# Patient Record
Sex: MALE | Race: ASIAN | NOT HISPANIC OR LATINO | URBAN - METROPOLITAN AREA
[De-identification: names, ages, dates, MRNs, and addresses within clinical notes are randomized per-mention and may not be internally consistent; named-entity substitution may affect disease eponyms.]

---

## 2021-11-15 ENCOUNTER — INPATIENT (INPATIENT)
Facility: HOSPITAL | Age: 41
LOS: 0 days | Discharge: HOME | DRG: 315 | End: 2021-11-16
Attending: INTERNAL MEDICINE | Admitting: INTERNAL MEDICINE
Payer: COMMERCIAL

## 2021-11-15 VITALS
TEMPERATURE: 99 F | WEIGHT: 210.1 LBS | HEIGHT: 69 IN | OXYGEN SATURATION: 93 % | SYSTOLIC BLOOD PRESSURE: 139 MMHG | HEART RATE: 106 BPM | RESPIRATION RATE: 20 BRPM | DIASTOLIC BLOOD PRESSURE: 74 MMHG

## 2021-11-15 LAB
ALBUMIN SERPL ELPH-MCNC: 4.3 G/DL — SIGNIFICANT CHANGE UP (ref 3.4–5)
ALP SERPL-CCNC: 75 U/L — SIGNIFICANT CHANGE UP (ref 40–120)
ALT FLD-CCNC: 72 U/L — HIGH (ref 12–42)
AMPHET UR-MCNC: NEGATIVE — SIGNIFICANT CHANGE UP
ANION GAP SERPL CALC-SCNC: 5 MMOL/L — LOW (ref 9–16)
APPEARANCE UR: CLEAR — SIGNIFICANT CHANGE UP
AST SERPL-CCNC: 46 U/L — HIGH (ref 15–37)
BARBITURATES UR SCN-MCNC: NEGATIVE — SIGNIFICANT CHANGE UP
BASOPHILS # BLD AUTO: 0.02 K/UL — SIGNIFICANT CHANGE UP (ref 0–0.2)
BASOPHILS NFR BLD AUTO: 0.3 % — SIGNIFICANT CHANGE UP (ref 0–2)
BENZODIAZ UR-MCNC: NEGATIVE — SIGNIFICANT CHANGE UP
BILIRUB SERPL-MCNC: 0.9 MG/DL — SIGNIFICANT CHANGE UP (ref 0.2–1.2)
BILIRUB UR-MCNC: NEGATIVE — SIGNIFICANT CHANGE UP
BUN SERPL-MCNC: 23 MG/DL — SIGNIFICANT CHANGE UP (ref 7–23)
CALCIUM SERPL-MCNC: 9 MG/DL — SIGNIFICANT CHANGE UP (ref 8.5–10.5)
CHLORIDE SERPL-SCNC: 102 MMOL/L — SIGNIFICANT CHANGE UP (ref 96–108)
CK MB BLD-MCNC: 0.29 % — SIGNIFICANT CHANGE UP
CK MB CFR SERPL CALC: 3.1 NG/ML — SIGNIFICANT CHANGE UP (ref 0.5–3.6)
CK SERPL-CCNC: 1079 U/L — HIGH (ref 39–308)
CK SERPL-CCNC: 1080 U/L — HIGH (ref 39–308)
CO2 SERPL-SCNC: 30 MMOL/L — SIGNIFICANT CHANGE UP (ref 22–31)
COCAINE METAB.OTHER UR-MCNC: NEGATIVE — SIGNIFICANT CHANGE UP
COLOR SPEC: YELLOW — SIGNIFICANT CHANGE UP
CREAT SERPL-MCNC: 1.16 MG/DL — SIGNIFICANT CHANGE UP (ref 0.5–1.3)
CRP SERPL-MCNC: <2 MG/L — SIGNIFICANT CHANGE UP (ref 0–9)
DIFF PNL FLD: NEGATIVE — SIGNIFICANT CHANGE UP
EOSINOPHIL # BLD AUTO: 0.07 K/UL — SIGNIFICANT CHANGE UP (ref 0–0.5)
EOSINOPHIL NFR BLD AUTO: 1 % — SIGNIFICANT CHANGE UP (ref 0–6)
FLUAV H1 2009 PAND RNA SPEC QL NAA+PROBE: SIGNIFICANT CHANGE UP
FLUAV H1 RNA SPEC QL NAA+PROBE: SIGNIFICANT CHANGE UP
FLUAV H3 RNA SPEC QL NAA+PROBE: SIGNIFICANT CHANGE UP
FLUAV SUBTYP SPEC NAA+PROBE: SIGNIFICANT CHANGE UP
FLUBV RNA SPEC QL NAA+PROBE: SIGNIFICANT CHANGE UP
GLUCOSE SERPL-MCNC: 146 MG/DL — HIGH (ref 70–99)
GLUCOSE UR QL: NEGATIVE — SIGNIFICANT CHANGE UP
HCT VFR BLD CALC: 46.2 % — SIGNIFICANT CHANGE UP (ref 39–50)
HGB BLD-MCNC: 15.1 G/DL — SIGNIFICANT CHANGE UP (ref 13–17)
IMM GRANULOCYTES NFR BLD AUTO: 0.4 % — SIGNIFICANT CHANGE UP (ref 0–1.5)
KETONES UR-MCNC: NEGATIVE — SIGNIFICANT CHANGE UP
LEUKOCYTE ESTERASE UR-ACNC: NEGATIVE — SIGNIFICANT CHANGE UP
LYMPHOCYTES # BLD AUTO: 2 K/UL — SIGNIFICANT CHANGE UP (ref 1–3.3)
LYMPHOCYTES # BLD AUTO: 27.8 % — SIGNIFICANT CHANGE UP (ref 13–44)
MCHC RBC-ENTMCNC: 27.2 PG — SIGNIFICANT CHANGE UP (ref 27–34)
MCHC RBC-ENTMCNC: 32.7 GM/DL — SIGNIFICANT CHANGE UP (ref 32–36)
MCV RBC AUTO: 83.1 FL — SIGNIFICANT CHANGE UP (ref 80–100)
METHADONE UR-MCNC: NEGATIVE — SIGNIFICANT CHANGE UP
MONOCYTES # BLD AUTO: 0.45 K/UL — SIGNIFICANT CHANGE UP (ref 0–0.9)
MONOCYTES NFR BLD AUTO: 6.3 % — SIGNIFICANT CHANGE UP (ref 2–14)
NEUTROPHILS # BLD AUTO: 4.62 K/UL — SIGNIFICANT CHANGE UP (ref 1.8–7.4)
NEUTROPHILS NFR BLD AUTO: 64.2 % — SIGNIFICANT CHANGE UP (ref 43–77)
NITRITE UR-MCNC: NEGATIVE — SIGNIFICANT CHANGE UP
NRBC # BLD: 0 /100 WBCS — SIGNIFICANT CHANGE UP (ref 0–0)
NT-PROBNP SERPL-SCNC: 10 PG/ML — SIGNIFICANT CHANGE UP
OPIATES UR-MCNC: NEGATIVE — SIGNIFICANT CHANGE UP
PCP SPEC-MCNC: SIGNIFICANT CHANGE UP
PCP UR-MCNC: NEGATIVE — SIGNIFICANT CHANGE UP
PH UR: 5.5 — SIGNIFICANT CHANGE UP (ref 5–8)
PLATELET # BLD AUTO: 192 K/UL — SIGNIFICANT CHANGE UP (ref 150–400)
POTASSIUM SERPL-MCNC: 4 MMOL/L — SIGNIFICANT CHANGE UP (ref 3.5–5.3)
POTASSIUM SERPL-SCNC: 4 MMOL/L — SIGNIFICANT CHANGE UP (ref 3.5–5.3)
PROT SERPL-MCNC: 7.5 G/DL — SIGNIFICANT CHANGE UP (ref 6.4–8.2)
PROT UR-MCNC: NEGATIVE MG/DL — SIGNIFICANT CHANGE UP
RAPID RVP RESULT: SIGNIFICANT CHANGE UP
RBC # BLD: 5.56 M/UL — SIGNIFICANT CHANGE UP (ref 4.2–5.8)
RBC # FLD: 13.5 % — SIGNIFICANT CHANGE UP (ref 10.3–14.5)
SARS-COV-2 RNA SPEC QL NAA+PROBE: SIGNIFICANT CHANGE UP
SODIUM SERPL-SCNC: 137 MMOL/L — SIGNIFICANT CHANGE UP (ref 132–145)
SP GR SPEC: 1.02 — SIGNIFICANT CHANGE UP (ref 1–1.03)
THC UR QL: NEGATIVE — SIGNIFICANT CHANGE UP
TROPONIN I, HIGH SENSITIVITY RESULT: 21.3 NG/L — SIGNIFICANT CHANGE UP
TSH SERPL-MCNC: 1.01 UIU/ML — SIGNIFICANT CHANGE UP (ref 0.36–3.74)
UROBILINOGEN FLD QL: 0.2 E.U./DL — SIGNIFICANT CHANGE UP
WBC # BLD: 7.19 K/UL — SIGNIFICANT CHANGE UP (ref 3.8–10.5)
WBC # FLD AUTO: 7.19 K/UL — SIGNIFICANT CHANGE UP (ref 3.8–10.5)

## 2021-11-15 PROCEDURE — 93010 ELECTROCARDIOGRAM REPORT: CPT

## 2021-11-15 PROCEDURE — 99285 EMERGENCY DEPT VISIT HI MDM: CPT

## 2021-11-15 PROCEDURE — 71275 CT ANGIOGRAPHY CHEST: CPT | Mod: 26

## 2021-11-15 RX ORDER — ACETAMINOPHEN 500 MG
650 TABLET ORAL ONCE
Refills: 0 | Status: COMPLETED | OUTPATIENT
Start: 2021-11-15 | End: 2021-11-15

## 2021-11-15 RX ADMIN — Medication 650 MILLIGRAM(S): at 19:08

## 2021-11-15 NOTE — ED ADULT NURSE NOTE - NSIMPLEMENTINTERV_GEN_ALL_ED
Implemented All Universal Safety Interventions:  [place] to call system. Call bell, personal items and telephone within reach. Instruct patient to call for assistance. Room bathroom lighting operational. Non-slip footwear when patient is off stretcher. Physically safe environment: no spills, clutter or unnecessary equipment. Stretcher in lowest position, wheels locked, appropriate side rails in place.

## 2021-11-15 NOTE — ED PROVIDER NOTE - CLINICAL SUMMARY MEDICAL DECISION MAKING FREE TEXT BOX
42yo M no pmh presents with 2-3 d of palpitations and mild SOB in setting of recently completing course of steroids.  On exam afebrile, tachycardic, hypoxic to low 90s, comfortable appearing, in no acute distress.  No unilateral leg swelling.  EKG w RAD and TWI inferolaterally.  Concern for PE; ddx includes ACS, electrolyte abn, other.  Labs, CTA chest, monitor, reassess. 42yo M no pmh presents with 2-3 d of palpitations and mild SOB in setting of recently completing course of steroids.  On exam afebrile, tachycardic, hypoxic to low 90s, comfortable appearing, in no acute distress.  No unilateral leg swelling.  EKG w RAD and TWI inferolaterally.  Concern for PE; ddx includes ACS, electrolyte abn, other.  Labs, CTA chest, monitor, reassess.    Trop negative.  BNP wnl.  CTA negative for PE; shows cardiomegaly, no pericardial effusion, no pulmonary edema.  CRP wnl.  No ST elevations to suggest pericarditis.    COVID negative.  Viral panel negative.    TSH wnl.  Repeat EKG still shows TWI inferolaterally.  Unclear etiology of symptoms and EKG abnormalities.  Will check CK-MB, admit to cardiology at  for further workup and management.  Case discussed w Dr. Ndiaye who will admit pt.  Will check R sided and posterior EKG's as well.

## 2021-11-15 NOTE — ED PROVIDER NOTE - OBJECTIVE STATEMENT
42yo M no significant pmh presents with palpitations and sensation of heart skipping a beat x2-3 days.  Associated with mild SOB.  No chest pain.  No pleurisy.  no back pain.  No unilateral leg weakness or numbness.  Went to PMD to be evaluated for palpitations, was sent to ED to be evaluated for PE.  Pt recently completed course of anabolic steroids.  No smoking.  No family hx of early CAD.  No unilateral leg swelling or pain.  No surgery, travel, or immobilization.  Immunized x2 for covid.

## 2021-11-15 NOTE — ED PROVIDER NOTE - NS ED ROS FT
Constitutional:  No fever, No chills, No night sweats  Eyes:  No visual changes, No discharge, No redness  ENMT:  No epistaxis, no nasal congestion, no throat pain, no difficulty swallowing  CV:  No chest pain, +palpitations, No peripheral edema  Resp:  No cough, +shortness of breath  GI:  No abdominal pain, No vomiting, No diarrhea  MSK:  No neck pain or stiffness, No joint swelling or pain, No back pain  Neuro: no loss of consciousness, no gait abnormality, no headache, no sensory deficits, and no weakness.  Skin:  No abrasions, no lesions, no rashes  Psych:  No known mental health issues

## 2021-11-15 NOTE — ED PROVIDER NOTE - PHYSICAL EXAMINATION
Constitutional:  Well appearing, awake, alert, oriented to person, place, time/situation and in no apparent distress.  HEENT: Airway patent, Nasal mucosa clear. Mouth with normal mucosa. Throat has no vesicles, no oropharyngeal exudates and uvula is midline.  Eyes: Clear bilaterally, pupils equal, round and reactive to light.  Cardiac: tachycardic, regular rhythm.  Respiratory: Breath sounds clear and equal bilaterally.  GI: Abdomen soft, non-tender, no guarding.  MSK: Spine appears normal, range of motion is not limited, no muscle or joint tenderness  Neuro: Alert and oriented, no focal deficits, no motor or sensory deficits.  Skin: Skin normal color for race, warm, dry and intact. No evidence of rash.

## 2021-11-16 ENCOUNTER — TRANSCRIPTION ENCOUNTER (OUTPATIENT)
Age: 41
End: 2021-11-16

## 2021-11-16 VITALS — DIASTOLIC BLOOD PRESSURE: 59 MMHG | SYSTOLIC BLOOD PRESSURE: 110 MMHG | HEART RATE: 71 BPM

## 2021-11-16 DIAGNOSIS — R74.8 ABNORMAL LEVELS OF OTHER SERUM ENZYMES: ICD-10-CM

## 2021-11-16 DIAGNOSIS — R09.02 HYPOXEMIA: ICD-10-CM

## 2021-11-16 LAB
ANION GAP SERPL CALC-SCNC: 11 MMOL/L — SIGNIFICANT CHANGE UP (ref 5–17)
BUN SERPL-MCNC: 17 MG/DL — SIGNIFICANT CHANGE UP (ref 7–23)
CALCIUM SERPL-MCNC: 8.8 MG/DL — SIGNIFICANT CHANGE UP (ref 8.4–10.5)
CHLORIDE SERPL-SCNC: 105 MMOL/L — SIGNIFICANT CHANGE UP (ref 96–108)
CK MB CFR SERPL CALC: 2.8 NG/ML — SIGNIFICANT CHANGE UP (ref 0–6.7)
CK MB CFR SERPL CALC: 3 NG/ML — SIGNIFICANT CHANGE UP (ref 0–6.7)
CK SERPL-CCNC: 813 U/L — HIGH (ref 30–200)
CK SERPL-CCNC: 862 U/L — HIGH (ref 30–200)
CO2 SERPL-SCNC: 25 MMOL/L — SIGNIFICANT CHANGE UP (ref 22–31)
CREAT SERPL-MCNC: 1.1 MG/DL — SIGNIFICANT CHANGE UP (ref 0.5–1.3)
ERYTHROCYTE [SEDIMENTATION RATE] IN BLOOD: 2 MM/HR — SIGNIFICANT CHANGE UP (ref 0–15)
GLUCOSE SERPL-MCNC: 102 MG/DL — HIGH (ref 70–99)
HCT VFR BLD CALC: 46.5 % — SIGNIFICANT CHANGE UP (ref 39–50)
HGB BLD-MCNC: 14.6 G/DL — SIGNIFICANT CHANGE UP (ref 13–17)
MAGNESIUM SERPL-MCNC: 2.5 MG/DL — SIGNIFICANT CHANGE UP (ref 1.6–2.6)
MCHC RBC-ENTMCNC: 26.4 PG — LOW (ref 27–34)
MCHC RBC-ENTMCNC: 31.4 GM/DL — LOW (ref 32–36)
MCV RBC AUTO: 84.2 FL — SIGNIFICANT CHANGE UP (ref 80–100)
NRBC # BLD: 0 /100 WBCS — SIGNIFICANT CHANGE UP (ref 0–0)
PLATELET # BLD AUTO: 195 K/UL — SIGNIFICANT CHANGE UP (ref 150–400)
POTASSIUM SERPL-MCNC: 4 MMOL/L — SIGNIFICANT CHANGE UP (ref 3.5–5.3)
POTASSIUM SERPL-SCNC: 4 MMOL/L — SIGNIFICANT CHANGE UP (ref 3.5–5.3)
RBC # BLD: 5.52 M/UL — SIGNIFICANT CHANGE UP (ref 4.2–5.8)
RBC # FLD: 13.9 % — SIGNIFICANT CHANGE UP (ref 10.3–14.5)
SODIUM SERPL-SCNC: 141 MMOL/L — SIGNIFICANT CHANGE UP (ref 135–145)
TROPONIN T SERPL-MCNC: 0.01 NG/ML — SIGNIFICANT CHANGE UP (ref 0–0.01)
TROPONIN T SERPL-MCNC: <0.01 NG/ML — SIGNIFICANT CHANGE UP (ref 0–0.01)
WBC # BLD: 5.95 K/UL — SIGNIFICANT CHANGE UP (ref 3.8–10.5)
WBC # FLD AUTO: 5.95 K/UL — SIGNIFICANT CHANGE UP (ref 3.8–10.5)

## 2021-11-16 PROCEDURE — 99239 HOSP IP/OBS DSCHRG MGMT >30: CPT

## 2021-11-16 PROCEDURE — 93005 ELECTROCARDIOGRAM TRACING: CPT

## 2021-11-16 PROCEDURE — 0225U NFCT DS DNA&RNA 21 SARSCOV2: CPT

## 2021-11-16 PROCEDURE — 71275 CT ANGIOGRAPHY CHEST: CPT

## 2021-11-16 PROCEDURE — 93306 TTE W/DOPPLER COMPLETE: CPT | Mod: 26

## 2021-11-16 PROCEDURE — 80307 DRUG TEST PRSMV CHEM ANLYZR: CPT

## 2021-11-16 PROCEDURE — 80053 COMPREHEN METABOLIC PANEL: CPT

## 2021-11-16 PROCEDURE — G0378: CPT

## 2021-11-16 PROCEDURE — 93307 TTE W/O DOPPLER COMPLETE: CPT

## 2021-11-16 PROCEDURE — 82553 CREATINE MB FRACTION: CPT

## 2021-11-16 PROCEDURE — 85652 RBC SED RATE AUTOMATED: CPT

## 2021-11-16 PROCEDURE — 84484 ASSAY OF TROPONIN QUANT: CPT

## 2021-11-16 PROCEDURE — 82550 ASSAY OF CK (CPK): CPT

## 2021-11-16 PROCEDURE — 84443 ASSAY THYROID STIM HORMONE: CPT

## 2021-11-16 PROCEDURE — 83880 ASSAY OF NATRIURETIC PEPTIDE: CPT

## 2021-11-16 PROCEDURE — 86140 C-REACTIVE PROTEIN: CPT

## 2021-11-16 PROCEDURE — 85025 COMPLETE CBC W/AUTO DIFF WBC: CPT

## 2021-11-16 PROCEDURE — 99285 EMERGENCY DEPT VISIT HI MDM: CPT | Mod: 25

## 2021-11-16 PROCEDURE — 80048 BASIC METABOLIC PNL TOTAL CA: CPT

## 2021-11-16 PROCEDURE — 36415 COLL VENOUS BLD VENIPUNCTURE: CPT

## 2021-11-16 PROCEDURE — 85027 COMPLETE CBC AUTOMATED: CPT

## 2021-11-16 PROCEDURE — 83735 ASSAY OF MAGNESIUM: CPT

## 2021-11-16 PROCEDURE — 81003 URINALYSIS AUTO W/O SCOPE: CPT

## 2021-11-16 RX ORDER — METOPROLOL TARTRATE 50 MG
25 TABLET ORAL DAILY
Refills: 0 | Status: DISCONTINUED | OUTPATIENT
Start: 2021-11-16 | End: 2021-11-16

## 2021-11-16 RX ORDER — TADALAFIL 10 MG/1
1 TABLET, FILM COATED ORAL
Qty: 0 | Refills: 0 | DISCHARGE

## 2021-11-16 RX ORDER — EMTRICITABINE AND TENOFOVIR DISOPROXIL FUMARATE 200; 300 MG/1; MG/1
0 TABLET, FILM COATED ORAL
Qty: 0 | Refills: 0 | DISCHARGE

## 2021-11-16 RX ORDER — METOPROLOL TARTRATE 50 MG
50 TABLET ORAL ONCE
Refills: 0 | Status: COMPLETED | OUTPATIENT
Start: 2021-11-16 | End: 2021-11-16

## 2021-11-16 RX ORDER — METOPROLOL TARTRATE 50 MG
1 TABLET ORAL
Qty: 30 | Refills: 2
Start: 2021-11-16 | End: 2022-02-13

## 2021-11-16 RX ORDER — ENOXAPARIN SODIUM 100 MG/ML
40 INJECTION SUBCUTANEOUS EVERY 24 HOURS
Refills: 0 | Status: DISCONTINUED | OUTPATIENT
Start: 2021-11-16 | End: 2021-11-16

## 2021-11-16 RX ADMIN — Medication 25 MILLIGRAM(S): at 16:05

## 2021-11-16 RX ADMIN — ENOXAPARIN SODIUM 40 MILLIGRAM(S): 100 INJECTION SUBCUTANEOUS at 12:17

## 2021-11-16 RX ADMIN — Medication 50 MILLIGRAM(S): at 12:17

## 2021-11-16 NOTE — DISCHARGE NOTE PROVIDER - NSDCCPCAREPLAN_GEN_ALL_CORE_FT
PRINCIPAL DISCHARGE DIAGNOSIS  Diagnosis: Cardiomyopathy  Assessment and Plan of Treatment: You were found to have a reduced pumping function. We recommend you STOP steriod use and start taking metoprolol succinate 25mg daily. Please see Dr. Mark for further work up with CCTA or stress test to look for blockages in the heart.      SECONDARY DISCHARGE DIAGNOSES  Diagnosis: Palpitation  Assessment and Plan of Treatment: Please start taking Metoprolol and a cardiac monitor will be sent to your home to be performed then you will need to send it back. Please Call [phone] to make an appointment with the EP team in 6 weeks

## 2021-11-16 NOTE — H&P ADULT - HISTORY OF PRESENT ILLNESS
40 y/o M with PMH of low testosterone (on weekly injection for the past 3 years), ED on cialis, presents to [facility] ED 11/15/21  with CC of  palpitations and sensation of heart skipping a beat, irregular pulse since Thursday, worsened Saturday and Sunday. Symptoms associated with mild SOB. Denies  chest pain, pleurisy, back pain, unilateral leg weakness or numbness.  Went to PMD to be evaluated for palpitations, was sent to ED to be evaluated for PE.  Pt recently completed a 12 week course  of anabolic steroids on Thursday and his symptoms started Thursday.   Immunized x2 for covid in April.    In ED, /74  RR 20 T 99.1 02 89- 93 RA, Labs revealed Trop I normal; CK 1080-->1079, CKMB 3.1, CRP normal; TSH WNL,  RVP negative, COVID negative , UA/Utox negative. EKG: NSR @ 100, RAD, TWI 2,3, AVF, V4-v6, CTA negative for PE  In ED, pt. received tylenol 650 mg PO X 1 and is now transferred to [facility] ED to R/O ACS.

## 2021-11-16 NOTE — H&P ADULT - ASSESSMENT
41 y.o male with low testosterone, on weekly injection, s/p anabolic steroid last week, presents with palpitations, skipped heart beat since last week after last dose of steroid, found to have CPK 1079, O2 sat 91% RA, EKG with TWI in 2, 3, v 3-6

## 2021-11-16 NOTE — DISCHARGE NOTE PROVIDER - CARE PROVIDER_API CALL
Gordon Mark)  Cardiovascular Disease  [address] [place] [address]  Phone: [phone]  Fax: [phone]  Follow Up Time: 1 week

## 2021-11-16 NOTE — H&P ADULT - PROBLEM SELECTOR PLAN 2
Denies chest pain  CPK 1079, maybe in the setting of weight lifting and dehydration  Repeat CPK, if still elevated will start IV hydration  Repeat troponin

## 2021-11-16 NOTE — DISCHARGE NOTE PROVIDER - NSDCMRMEDTOKEN_GEN_ALL_CORE_FT
Cialis 5 mg oral tablet: 1 tab(s) orally once a day  testosterone enanthate 200 mg/mL intramuscular solution: 200 milligram(s) intramuscular once a week  [place]:    Cialis 5 mg oral tablet: 1 tab(s) orally once a day  metoprolol succinate 25 mg oral tablet, extended release: 1 tab(s) orally once a day  testosterone enanthate 200 mg/mL intramuscular solution: 200 milligram(s) intramuscular once a week  [place]:

## 2021-11-16 NOTE — DISCHARGE NOTE PROVIDER - HOSPITAL COURSE
42 y/o M with PMH of low testosterone (on weekly injection for the past 3 years), ED on cialis, presents to [facility] ED 11/15/21  with CC of  palpitations and sensation of heart skipping a beat, irregular pulse since Thursday, worsened Saturday and Sunday. Symptoms associated with mild SOB. Denies  chest pain, pleurisy, back pain, unilateral leg weakness or numbness.  Went to PMD to be evaluated for palpitations, was sent to ED to be evaluated for PE.  Pt recently completed a 12 week course  of anabolic steroids on Thursday and his symptoms started Thursday.   Immunized x2 for covid in April.    In ED, /74  RR 20 T 99.1 02 89- 93 RA, Labs revealed Trop I normal; CK 1080-->1079, CKMB 3.1, CRP normal; TSH WNL,  RVP negative, COVID negative , UA/Utox negative. EKG: NSR @ 100, RAD, TWI 2,3, AVF, V4-v6, CTA negative for PE  In ED, pt. received tylenol 650 mg PO X 1 and is now transferred to [facility] ED to R/O ACS.     Patient underwent echo revealing  Left ventricular systolic function is mildly reduced, LVEF 45-50%. Reduced right ventricular systolic function  Injection of agitated saline via a peripheral vein reveals bubbles in the left heart within 3 heart beats with Valsalva, most consistent with a small patent foramen ovale. No significant valvular disease. No evidence of pulmonary hypertension, pulmonary artery systolic pressure is 24 mmHg.             40 y/o M with PMH of low testosterone (on weekly injection for the past 3 years), ED on cialis, presents to [facility] ED 11/15/21  with CC of  palpitations and sensation of heart skipping a beat, irregular pulse since Thursday, worsened Saturday and Sunday. Symptoms associated with mild SOB. Denies  chest pain, pleurisy, back pain, unilateral leg weakness or numbness.  Went to PMD to be evaluated for palpitations, was sent to ED to be evaluated for PE.  Pt recently completed a 12 week course  of anabolic steroids on Thursday and his symptoms started Thursday.   Immunized x2 for covid in April.    In ED, /74  RR 20 T 99.1 02 89- 93 RA, Labs revealed Trop I normal; CK 1080-->1079, CKMB 3.1, CRP normal; TSH WNL,  RVP negative, COVID negative , UA/Utox negative. EKG: NSR @ 100, RAD, TWI 2,3, AVF, V4-v6, CTA negative for PE  In ED, pt. received tylenol 650 mg PO X 1 and is now transferred to [facility] ED to R/O ACS.     Patient underwent echo revealing  Left ventricular systolic function is mildly reduced, LVEF 45-50%. Reduced right ventricular systolic function  Injection of agitated saline via a peripheral vein reveals bubbles in the left heart within 3 heart beats with Valsalva, most consistent with a small patent foramen ovale. No significant valvular disease. No evidence of pulmonary hypertension, pulmonary artery systolic pressure is 24 mmHg. Patient planned for coronary CTA however unable to be performed as patient took Cialis 11/ 15 AM.                        40 y/o M with PMH of low testosterone (on weekly injection for the past 3 years), ED on cialis, presents to [facility] ED 11/15/21  with CC of  palpitations and sensation of heart skipping a beat, irregular pulse since Thursday, worsened Saturday and Sunday. Symptoms associated with mild SOB. Denies  chest pain, pleurisy, back pain, unilateral leg weakness or numbness.  Went to PMD to be evaluated for palpitations, was sent to ED to be evaluated for PE.  Pt recently completed a 12 week course  of anabolic steroids on Thursday and his symptoms started Thursday.   Immunized x2 for covid in April.    In ED, /74  RR 20 T 99.1 02 89- 93 RA, Labs revealed Trop I normal; CK 1080-->1079, CKMB 3.1, CRP normal; TSH WNL,  RVP negative, COVID negative , UA/Utox negative. EKG: NSR @ 100, RAD, TWI 2,3, AVF, V4-v6, CTA negative for PE  In ED, pt. received tylenol 650 mg PO X 1 and is now transferred to [facility] ED to R/O ACS. Ck elevation  more consistent with Rhabdomyolsis     Patient underwent echo revealing  Left ventricular systolic function is mildly reduced, LVEF 45-50%. Reduced right ventricular systolic function  Injection of agitated saline via a peripheral vein reveals bubbles in the left heart within 3 heart beats with Valsalva, most consistent with a small patent foramen ovale. No significant valvular disease. No evidence of pulmonary hypertension, pulmonary artery systolic pressure is 24 mmHg. Patient planned for coronary CTA however unable to be performed as patient took Cialis 11/ 15 AM.     discussed results with patient, Patient symptomatic and HD stable. Patient Stable for discharge Patient advised close follow up with Dr. Ruiz for Ischemic evaluation and further work up. Patient was started on Metoprolol succinate 25mg qd and advised discontinuation of Anabolic steroids, as they could be contributing to reduced EF.  Patient also to have long term monitor mailed to his home for further evaluation of palpiations.

## 2021-11-16 NOTE — H&P ADULT - NSHPPHYSICALEXAM_GEN_ALL_CORE
T(C): 36.8 (11-16-21 @ 02:06), Max: 37.3 (11-15-21 @ 15:23)  HR: 96 (11-16-21 @ 01:22) (96 - 106)  BP: 147/93 (11-16-21 @ 01:22) (130/83 - 147/93)  RR: 16 (11-16-21 @ 01:22) (16 - 20)  SpO2: 95% (11-16-21 @ 01:22) (93% - 95%)  Wt(kg): --    Appearance: Normal	  HEENT:   Normal oral mucosa, PERRL, EOMI	  Neck: Supple, - JVD; No Carotid Bruit and 2+ pulses B/L  Cardiovascular: Normal S1 S2, No JVD, No murmurs  Respiratory: Lungs clear to auscultation, no Rales, Rhonchi, Wheezing	  Gastrointestinal:  Soft, Non-tender, + BS	  Skin: No rashes, No ecchymoses, No cyanosis  Extremities: Normal range of motion, No clubbing, cyanosis or edema  Vascular: Femoral pulses 2+ b/l without bruit, DP 1+ b/l, PT 1+ b/l  Neurologic: Non-focal  Psychiatry: A & O x 3, Mood & affect appropriate

## 2021-11-16 NOTE — PATIENT PROFILE ADULT - STATED REASON FOR ADMISSION
c/o irregular heartbeat with normal activity; reports some work stress; reports just getting over a recent cold with upper respiratory sx, no fever.  Reports irregular radial pulse.  Went to PMD and sent to [facility] for CT Scan.

## 2021-11-16 NOTE — PATIENT PROFILE ADULT - FUNCTIONAL SCREEN CURRENT LEVEL: COMMUNICATION, MLM
States bilateral hands numbness and tingling, especially while sleeping. States bilateral soles of feet burning sensation. States random tingling spots and muscle aches. 0 = understands/communicates without difficulty

## 2021-11-16 NOTE — H&P ADULT - NSHPREVIEWOFSYSTEMS_GEN_ALL_CORE
GENERAL, CONSTITUTIONAL : denies recent weight loss, fever, chills  EYES, VISION: denies changes in vision   EARS, NOSE, THROAT: denies hearing loss  HEART, CARDIOVASCULAR: +  arrhythmia, palpitations, SOB. Denies chest pain, LE edema, claudication  RESPIRATORY: Denies wheezing, PND, orthopnea  GASTROINTESTINAL: +cough, SOB. Denies abdominal pain, heartburn, bloody stool, dark tarry stool  GENITOURINARY: Denies frequent urination, urgency  MUSCULOSKELETAL denies joint pain or swelling, restricted motion, musculoskeletal pain.   SKIN & INTEGUMENTARY Denies rashes, sores, blisters, blisters, growths.  NEUROLOGICAL: Denies numbness or tingling sensations, sensation loss, burning.   PSYCHIATRIC: Denies nervousness, anxiety, depression  ENDOCRINE Denies heat or cold intolerance, excessive thirst  HEMATOLOGIC/LYMPHATIC: Denies abnormal bleeding, bleeding of any kind

## 2021-11-16 NOTE — DISCHARGE NOTE NURSING/CASE MANAGEMENT/SOCIAL WORK - PATIENT PORTAL LINK FT
You can access the FollowMyHealth Patient Portal offered by [facility] by registering at the following website: [email or web address]. By joining [facility]’s FollowMyHealth portal, you will also be able to view your health information using other applications (apps) compatible with our system.

## 2021-11-16 NOTE — H&P ADULT - NSHPLABSRESULTS_GEN_ALL_CORE
15.1   7.19  )-----------( 192      ( 15 Nov 2021 16:06 )             46.2       11-15    137  |  102  |  23  ----------------------------<  146<H>  4.0   |  30  |  1.16    Ca    9.0      15 Nov 2021 16:06    TPro  7.5  /  Alb  4.3  /  TBili  0.9  /  DBili  x   /  AST  46<H>  /  ALT  72<H>  /  AlkPhos  75  11-15          CARDIAC MARKERS ( 15 Nov 2021 20:06 )  x     / x     / 1079 U/L / x     / 3.1 ng/mL  CARDIAC MARKERS ( 15 Nov 2021 19:04 )  x     / x     / 1080 U/L / x     / x            Urinalysis Basic - ( 15 Nov 2021 19:43 )    Color: Yellow / Appearance: Clear / S.025 / pH: x  Gluc: x / Ketone: NEGATIVE  / Bili: NEGATIVE / Urobili: 0.2 E.U./dL   Blood: x / Protein: NEGATIVE mg/dL / Nitrite: NEGATIVE   Leuk Esterase: NEGATIVE / RBC: x / WBC x   Sq Epi: x / Non Sq Epi: x / Bacteria: x        EKG: TWI in 2, 3, avl, leads v 3-6

## 2021-11-17 PROBLEM — R79.89 OTHER SPECIFIED ABNORMAL FINDINGS OF BLOOD CHEMISTRY: Chronic | Status: ACTIVE | Noted: 2021-11-16

## 2021-11-17 PROBLEM — N52.9 MALE ERECTILE DYSFUNCTION, UNSPECIFIED: Chronic | Status: ACTIVE | Noted: 2021-11-16

## 2021-11-17 PROBLEM — Z00.00 ENCOUNTER FOR PREVENTIVE HEALTH EXAMINATION: Status: ACTIVE | Noted: 2021-11-17

## 2021-11-18 ENCOUNTER — EMERGENCY (EMERGENCY)
Facility: HOSPITAL | Age: 41
LOS: 1 days | Discharge: HOME | End: 2021-11-18
Attending: EMERGENCY MEDICINE | Admitting: EMERGENCY MEDICINE
Payer: COMMERCIAL

## 2021-11-18 VITALS
OXYGEN SATURATION: 95 % | TEMPERATURE: 98 F | HEART RATE: 92 BPM | RESPIRATION RATE: 18 BRPM | SYSTOLIC BLOOD PRESSURE: 139 MMHG | DIASTOLIC BLOOD PRESSURE: 85 MMHG

## 2021-11-18 VITALS
TEMPERATURE: 98 F | WEIGHT: 205.03 LBS | HEART RATE: 106 BPM | RESPIRATION RATE: 13 BRPM | SYSTOLIC BLOOD PRESSURE: 137 MMHG | HEIGHT: 69 IN | OXYGEN SATURATION: 95 % | DIASTOLIC BLOOD PRESSURE: 91 MMHG

## 2021-11-18 DIAGNOSIS — R06.02 SHORTNESS OF BREATH: ICD-10-CM

## 2021-11-18 DIAGNOSIS — R00.0 TACHYCARDIA, UNSPECIFIED: ICD-10-CM

## 2021-11-18 DIAGNOSIS — R06.00 DYSPNEA, UNSPECIFIED: ICD-10-CM

## 2021-11-18 LAB
ALBUMIN SERPL ELPH-MCNC: 3.9 G/DL — SIGNIFICANT CHANGE UP (ref 3.4–5)
ALP SERPL-CCNC: 69 U/L — SIGNIFICANT CHANGE UP (ref 40–120)
ALT FLD-CCNC: 68 U/L — HIGH (ref 12–42)
ANION GAP SERPL CALC-SCNC: 11 MMOL/L — SIGNIFICANT CHANGE UP (ref 9–16)
AST SERPL-CCNC: 55 U/L — HIGH (ref 15–37)
BASOPHILS # BLD AUTO: 0.02 K/UL — SIGNIFICANT CHANGE UP (ref 0–0.2)
BASOPHILS NFR BLD AUTO: 0.3 % — SIGNIFICANT CHANGE UP (ref 0–2)
BILIRUB SERPL-MCNC: 1.1 MG/DL — SIGNIFICANT CHANGE UP (ref 0.2–1.2)
BUN SERPL-MCNC: 21 MG/DL — SIGNIFICANT CHANGE UP (ref 7–23)
CALCIUM SERPL-MCNC: 8.9 MG/DL — SIGNIFICANT CHANGE UP (ref 8.5–10.5)
CHLORIDE SERPL-SCNC: 102 MMOL/L — SIGNIFICANT CHANGE UP (ref 96–108)
CO2 SERPL-SCNC: 22 MMOL/L — SIGNIFICANT CHANGE UP (ref 22–31)
CREAT SERPL-MCNC: 1.16 MG/DL — SIGNIFICANT CHANGE UP (ref 0.5–1.3)
EOSINOPHIL # BLD AUTO: 0.06 K/UL — SIGNIFICANT CHANGE UP (ref 0–0.5)
EOSINOPHIL NFR BLD AUTO: 0.9 % — SIGNIFICANT CHANGE UP (ref 0–6)
GLUCOSE SERPL-MCNC: 167 MG/DL — HIGH (ref 70–99)
HCT VFR BLD CALC: 46.7 % — SIGNIFICANT CHANGE UP (ref 39–50)
HGB BLD-MCNC: 15.2 G/DL — SIGNIFICANT CHANGE UP (ref 13–17)
IMM GRANULOCYTES NFR BLD AUTO: 0.3 % — SIGNIFICANT CHANGE UP (ref 0–1.5)
LYMPHOCYTES # BLD AUTO: 1.86 K/UL — SIGNIFICANT CHANGE UP (ref 1–3.3)
LYMPHOCYTES # BLD AUTO: 28 % — SIGNIFICANT CHANGE UP (ref 13–44)
MANUAL SMEAR VERIFICATION: SIGNIFICANT CHANGE UP
MCHC RBC-ENTMCNC: 27.1 PG — SIGNIFICANT CHANGE UP (ref 27–34)
MCHC RBC-ENTMCNC: 32.5 GM/DL — SIGNIFICANT CHANGE UP (ref 32–36)
MCV RBC AUTO: 83.2 FL — SIGNIFICANT CHANGE UP (ref 80–100)
MONOCYTES # BLD AUTO: 0.41 K/UL — SIGNIFICANT CHANGE UP (ref 0–0.9)
MONOCYTES NFR BLD AUTO: 6.2 % — SIGNIFICANT CHANGE UP (ref 2–14)
NEUTROPHILS # BLD AUTO: 4.28 K/UL — SIGNIFICANT CHANGE UP (ref 1.8–7.4)
NEUTROPHILS NFR BLD AUTO: 64.3 % — SIGNIFICANT CHANGE UP (ref 43–77)
NRBC # BLD: 0 /100 WBCS — SIGNIFICANT CHANGE UP (ref 0–0)
NT-PROBNP SERPL-SCNC: 21 PG/ML — SIGNIFICANT CHANGE UP
PLAT MORPH BLD: SIGNIFICANT CHANGE UP
PLATELET # BLD AUTO: 209 K/UL — SIGNIFICANT CHANGE UP (ref 150–400)
POTASSIUM SERPL-MCNC: 4.1 MMOL/L — SIGNIFICANT CHANGE UP (ref 3.5–5.3)
POTASSIUM SERPL-SCNC: 4.1 MMOL/L — SIGNIFICANT CHANGE UP (ref 3.5–5.3)
PROT SERPL-MCNC: 7.3 G/DL — SIGNIFICANT CHANGE UP (ref 6.4–8.2)
RBC # BLD: 5.61 M/UL — SIGNIFICANT CHANGE UP (ref 4.2–5.8)
RBC # FLD: 13.8 % — SIGNIFICANT CHANGE UP (ref 10.3–14.5)
RBC BLD AUTO: SIGNIFICANT CHANGE UP
SODIUM SERPL-SCNC: 135 MMOL/L — SIGNIFICANT CHANGE UP (ref 132–145)
TROPONIN I, HIGH SENSITIVITY RESULT: 19 NG/L — SIGNIFICANT CHANGE UP
WBC # BLD: 6.65 K/UL — SIGNIFICANT CHANGE UP (ref 3.8–10.5)
WBC # FLD AUTO: 6.65 K/UL — SIGNIFICANT CHANGE UP (ref 3.8–10.5)

## 2021-11-18 PROCEDURE — 93010 ELECTROCARDIOGRAM REPORT: CPT

## 2021-11-18 PROCEDURE — 99284 EMERGENCY DEPT VISIT MOD MDM: CPT

## 2021-11-18 PROCEDURE — 71046 X-RAY EXAM CHEST 2 VIEWS: CPT | Mod: 26

## 2021-11-18 NOTE — ED PROVIDER NOTE - NS ED MD DISPO DISCHARGE CCDA
NEUROINTERVENTIONAL SURGERY POST-PROCEDURE NOTE    PROCEDURE:  Cerebral angiogram and Surpass embolization of right supraclinoid aneurysm    VESSEL(S) STUDIED:  1. Right common carotid artery  2. Right internal carotid artery  3. Left common carotid artery VESSEL(S) TREATED:  1. Right internal carotid artery      PRELIMINARY REPORT & DISPOSITION:   Surpass embolization of right ICA aneurysm. No complications during the procedure    COMPLICATIONS:  None    FOLLOW-UP:  [id number]  SBP <140 mmHg DATE OF SERVICE:  11/5/2020 2:04 PM     ATTENDING SURGEON(S):  Opal Mar MD    ANESTHESIA:   General anesthesia    MEDICATIONS:   See nursing record    PUNCTURE SITE:  Right common femoral artery. Arteriotomy closed with 8F  Flat x 2 hours.  Right leg straight for 4h         Opal Rouse MD  NeuroInterventional Surgery
Patient/Caregiver provided printed discharge information.

## 2021-11-18 NOTE — ED ADULT NURSE NOTE - OBJECTIVE STATEMENT
Pt w/ recent admission to [facility] for cardiac work-up 2ndary to episode of palpitations and DC'd w/ EF of 45% w/ assumed cardiomyopathy w/ OP f/u w/ cards to tomorrow presents c/o episode of SOB while at work.  Pt endorses sxs resolved.  Pt denies CP, dizziness, LOC, N/V.  Pt in sinus tach.  Pt pending labs.

## 2021-11-18 NOTE — ED PROVIDER NOTE - PATIENT PORTAL LINK FT
You can access the FollowMyHealth Patient Portal offered by [facility] by registering at the following website: [email or web address]. By joining [facility]’s FollowMyHealth portal, you will also be able to view your health information using other applications (apps) compatible with our system.

## 2021-11-18 NOTE — ED PROVIDER NOTE - CLINICAL SUMMARY MEDICAL DECISION MAKING FREE TEXT BOX
Patient presents to the ED complaining of shortness of breath. Patient was seen in this ED on November 15th and admitted to [facility]. Patient found to have cardiomyopathy and was prescribed metoprolol which he states he has been taking for the past 2 days (2 doses). Patient is scheduled to follow up with Dr. Samaniego tomorrow. Dr. Samaniego consulted and he states that if patient found not to be in heart failure, patient can follow up with appointment as scheduled tomorrow. Patient presents to the ED complaining of shortness of breath. Patient was seen in this ED on November 15th and admitted to [facility]. Patient found to have cardiomyopathy and was prescribed metoprolol which he states he has been taking for the past 2 days (2 doses). Patient is scheduled to follow up with Dr. Herron tomorrow. Dr. Herron consulted and he states that if patient found not to be in heart failure, patient can follow up with appointment as scheduled tomorrow. Patient well appearing, labs and xray within normal limits. No evidence of acute HF exacerbation. Patient anxious, syptoms resolved. D/C

## 2021-11-18 NOTE — ED PROVIDER NOTE - OBJECTIVE STATEMENT
40 y/o male with PMHx cardiomyopathy (on Metoprolol) presents to the ED complaining of shortness of breath x 6 hours. Patient states that this morning, he was on the way to work and began to feel short of breath. The shortness of breath persisted while he was at work, and then he came to the ED. Patient states that his symptoms have resolved in the ED. Patient was recently seen in the ED 3 days ago for palpitations and was admitted to [facility] and discharged the following day. Patient also states that he was anxious today because of the recent cardiac issues. He denies fever, chills, nausea, or vomiting. 42 y/o male with PMHx cardiomyopathy, diagnosed on 11/16 at [facility], started on metoprolol, follow up scheduled with with Dr. Carreno tomorrow,  presents to the ED complaining of shortness of breath x 6 hours. Patient states that this morning, he was on the way to work and began to feel short of breath. The shortness of breath persisted while he was at work, and then he came to the ED. Patient states that his symptoms have resolved in the ED.  Patient also states that he was anxious today because of the recent new diagnoses. He denies fever, chills, nausea, or vomiting.

## 2021-11-19 ENCOUNTER — APPOINTMENT (OUTPATIENT)
Dept: HEART AND VASCULAR | Facility: CLINIC | Age: 41
End: 2021-11-19
Payer: COMMERCIAL

## 2021-11-19 ENCOUNTER — NON-APPOINTMENT (OUTPATIENT)
Age: 41
End: 2021-11-19

## 2021-11-19 VITALS
OXYGEN SATURATION: 95 % | HEIGHT: 69 IN | SYSTOLIC BLOOD PRESSURE: 139 MMHG | WEIGHT: 211.31 LBS | BODY MASS INDEX: 31.3 KG/M2 | HEART RATE: 95 BPM | DIASTOLIC BLOOD PRESSURE: 83 MMHG | TEMPERATURE: 98.1 F

## 2021-11-19 DIAGNOSIS — E66.3 OVERWEIGHT: ICD-10-CM

## 2021-11-19 PROCEDURE — 99214 OFFICE O/P EST MOD 30 MIN: CPT

## 2021-11-19 PROCEDURE — 93000 ELECTROCARDIOGRAM COMPLETE: CPT

## 2021-11-19 NOTE — REASON FOR VISIT
[Symptom and Test Evaluation] : symptom and test evaluation [FreeTextEntry1] : 40 y/o M with PMH of low testosterone (on weekly injection for the past 3\par years), ED on cialis, presents to [facility] ED 11/15/21 with CC of palpitations\par and sensation of heart skipping a beat, irregular pulse since Thursday,\par worsened Saturday and Sunday. Symptoms associated with mild SOB. Denies chest\par pain, pleurisy, back pain, unilateral leg weakness or numbness. Went to PMD to\par be evaluated for palpitations, was sent to ED to be evaluated for PE. Pt\par recently completed a 12 week course of anabolic steroids on Thursday and his\par symptoms started Thursday. Immunized x2 for covid in April.\par \par In ED, /74  RR 20 T 99.1 02 89- 93 RA, Labs revealed Trop I normal;\par CK 1080-->1079, CKMB 3.1, CRP normal; TSH WNL, RVP negative, COVID negative ,\par UA/Utox negative. EKG: NSR @ 100, RAD, TWI 2,3, AVF, V4-v6, CTA negative for PE\par In ED, pt. received tylenol 650 mg PO X 1 and is now transferred to [facility] ED to\par R/O ACS. Ck elevation more consistent with Rhabdomyolsis\par \par Patient underwent echo revealing Left ventricular systolic function is mildly\par reduced, LVEF 45-50%. Reduced right ventricular systolic function Injection of\par agitated saline via a peripheral vein reveals bubbles in the left heart within\par 3 heart beats with Valsalva, most consistent with a small patent foramen ovale.\par No significant valvular disease. No evidence of pulmonary hypertension,\par pulmonary artery systolic pressure is 24 mmHg. Patient planned for coronary CTA\par however unable to be performed as patient took Cialis 11/ 15 AM.\par \par discussed results with patient, Patient symptomatic and HD stable. Patient\par Stable for discharge Patient advised close follow up with Dr. Ruiz for\par Ischemic evaluation and further work up. Patient was started on Metoprolol\par succinate 25mg qd and advised discontinuation of Anabolic steroids, as they\par could be contributing to reduced EF. Patient also to have long term monitor\par mailed to his home for further evaluation of palpiations.

## 2021-11-19 NOTE — PHYSICAL EXAM
[Well Developed] : well developed [Well Nourished] : well nourished [No Acute Distress] : no acute distress [Normal Conjunctiva] : normal conjunctiva [Normal Venous Pressure] : normal venous pressure [No Carotid Bruit] : no carotid bruit [Normal S1, S2] : normal S1, S2 [No Murmur] : no murmur [No Rub] : no rub [No Gallop] : no gallop [Clear Lung Fields] : clear lung fields [Good Air Entry] : good air entry [No Respiratory Distress] : no respiratory distress  [Soft] : abdomen soft [Non Tender] : non-tender [Normal Bowel Sounds] : normal bowel sounds [No Masses/organomegaly] : no masses/organomegaly [Normal Gait] : normal gait [No Edema] : no edema [No Cyanosis] : no cyanosis [No Clubbing] : no clubbing [No Varicosities] : no varicosities [No Rash] : no rash [Moves all extremities] : moves all extremities [No Skin Lesions] : no skin lesions [No Focal Deficits] : no focal deficits [Normal Speech] : normal speech [Alert and Oriented] : alert and oriented [Normal memory] : normal memory

## 2021-11-19 NOTE — DISCUSSION/SUMMARY
[FreeTextEntry1] : CHF will bring in for echocardiogram provided his insurance company feels that it is a reasonable course of action and deems appropriate to approve.\par HTN increase metoprolol today to 25 BID\par CP will bring in for a stress ekg as we are unable to approve stress echo\par EKG section of the chart --- secondary to symptoms above an electrocardiogram also known as an EKG was performed.  Risks and benefits discussed with the patient. Patient was given time and privacy to changed into a gown. Shortly after, standard 10 leads were applied and a [employer] system was used to perform the study. The results were subsequently reviewed by attending physician and discussed with the patient. The study showed a normal sinus rhythm and no ST-T suggestive of ischemia. Order for the EKG was placed in the chart. The results were documented. Billing submitted. Emotional support provided.\par

## 2021-11-20 DIAGNOSIS — N52.9 MALE ERECTILE DYSFUNCTION, UNSPECIFIED: ICD-10-CM

## 2021-11-20 DIAGNOSIS — E29.1 TESTICULAR HYPOFUNCTION: ICD-10-CM

## 2021-11-20 DIAGNOSIS — Q21.1 ATRIAL SEPTAL DEFECT: ICD-10-CM

## 2021-11-20 DIAGNOSIS — I42.9 CARDIOMYOPATHY, UNSPECIFIED: ICD-10-CM

## 2021-11-20 DIAGNOSIS — E86.0 DEHYDRATION: ICD-10-CM

## 2021-11-20 DIAGNOSIS — R09.02 HYPOXEMIA: ICD-10-CM

## 2021-11-20 DIAGNOSIS — Z79.899 OTHER LONG TERM (CURRENT) DRUG THERAPY: ICD-10-CM

## 2021-11-20 DIAGNOSIS — Z20.822 CONTACT WITH AND (SUSPECTED) EXPOSURE TO COVID-19: ICD-10-CM

## 2021-11-20 DIAGNOSIS — R00.2 PALPITATIONS: ICD-10-CM

## 2021-11-20 DIAGNOSIS — M62.82 RHABDOMYOLYSIS: ICD-10-CM

## 2021-11-24 PROBLEM — I42.9 CARDIOMYOPATHY, UNSPECIFIED: Chronic | Status: ACTIVE | Noted: 2021-11-18

## 2021-12-15 ENCOUNTER — APPOINTMENT (OUTPATIENT)
Dept: HEART AND VASCULAR | Facility: CLINIC | Age: 41
End: 2021-12-15
Payer: COMMERCIAL

## 2021-12-15 ENCOUNTER — APPOINTMENT (OUTPATIENT)
Dept: HEART AND VASCULAR | Facility: CLINIC | Age: 41
End: 2021-12-15

## 2021-12-15 ENCOUNTER — NON-APPOINTMENT (OUTPATIENT)
Age: 41
End: 2021-12-15

## 2021-12-15 VITALS
DIASTOLIC BLOOD PRESSURE: 76 MMHG | SYSTOLIC BLOOD PRESSURE: 121 MMHG | BODY MASS INDEX: 30.96 KG/M2 | OXYGEN SATURATION: 94 % | HEIGHT: 69 IN | WEIGHT: 209 LBS | HEART RATE: 116 BPM

## 2021-12-15 DIAGNOSIS — R00.0 TACHYCARDIA, UNSPECIFIED: ICD-10-CM

## 2021-12-15 PROCEDURE — 99214 OFFICE O/P EST MOD 30 MIN: CPT

## 2021-12-15 PROCEDURE — 93351 STRESS TTE COMPLETE: CPT

## 2021-12-15 NOTE — DISCUSSION/SUMMARY
[FreeTextEntry1] : CP/Palps/CHF stress echo reviewed. will maintain on metoprolol and re-evaluate\par HTN - SANTA  and I had an extensive discussion regarding his blood pressure management. Patient will continue taking current medications in addition to maintaining a low Na diet, with periodic b/p checks at home.\par

## 2021-12-15 NOTE — REASON FOR VISIT
[Symptom and Test Evaluation] : symptom and test evaluation [FreeTextEntry1] : 40 y/o M with PMH of low testosterone (on weekly injection for the past 3 years), ED on cialis, presents to [facility] ED 11/15/21 with CC of palpitations and sensation of heart skipping a beat, irregular pulse; he completed a stress echocardiogram. He is taking metoprolol. Blood pressure is better on follow up. No acute complains.

## 2021-12-27 ENCOUNTER — APPOINTMENT (OUTPATIENT)
Dept: PULMONOLOGY | Facility: CLINIC | Age: 41
End: 2021-12-27
Payer: COMMERCIAL

## 2021-12-27 PROCEDURE — 99443: CPT

## 2021-12-27 NOTE — CONSULT LETTER
[Dear  ___] : Dear  [unfilled], [Consult Letter:] : I had the pleasure of evaluating your patient, [unfilled]. [Please see my note below.] : Please see my note below. [Consult Closing:] : Thank you very much for allowing me to participate in the care of this patient.  If you have any questions, please do not hesitate to contact me. [Sincerely,] : Sincerely, [FreeTextEntry3] : Linh Evans MD\par \par [employer] [facility] [employer] [facility]\par Pulmonary, Critical Care, and Sleep Medicine\par

## 2021-12-27 NOTE — REVIEW OF SYSTEMS
[Difficulty Initiating Sleep] : no difficulty falling asleep [Lower Extremity Discomfort] : no lower extremity discomfort [Late day/ Evening symptoms] : no late day/evening symptoms [Unusual Sleep Behavior] : no unusual sleep behavior [Negative] : Psychiatric

## 2021-12-27 NOTE — ASSESSMENT
[FreeTextEntry1] : 42yo man with new HTN, hx of recent anabolic steroid use; reduced EF referred by Dr. Mark for MARICARMEN evaluation. Snores. Does feel sleepy during the day. Indicated for testing. Referred to the [facility] for an HST. Follow up after HST is resulted.

## 2021-12-27 NOTE — HISTORY OF PRESENT ILLNESS
[Home] : at home, [unfilled] , at the time of the visit. [Medical Office: ([facility])___] : at the medical office located in  [Verbal consent obtained from patient] : the patient, [unfilled] [FreeTextEntry1] : 42yo man with new HTN, hx of recent anabolic steroid use; reduced EF referred for MARICARMEN evaluation. Snores. Does feel sleepy during the day. Stopped anabolic steroids. No family hx of SDB. [ESS] : 12

## 2022-01-25 ENCOUNTER — OUTPATIENT (OUTPATIENT)
Dept: OUTPATIENT SERVICES | Facility: HOSPITAL | Age: 42
LOS: 1 days | End: 2022-01-25
Payer: COMMERCIAL

## 2022-01-25 ENCOUNTER — APPOINTMENT (OUTPATIENT)
Dept: SLEEP CENTER | Facility: HOME HEALTH | Age: 42
End: 2022-01-25
Payer: COMMERCIAL

## 2022-01-25 DIAGNOSIS — G47.33 OBSTRUCTIVE SLEEP APNEA (ADULT) (PEDIATRIC): ICD-10-CM

## 2022-01-25 PROCEDURE — 95800 SLP STDY UNATTENDED: CPT | Mod: 26

## 2022-01-25 PROCEDURE — 95800 SLP STDY UNATTENDED: CPT

## 2022-02-04 ENCOUNTER — APPOINTMENT (OUTPATIENT)
Dept: PULMONOLOGY | Facility: CLINIC | Age: 42
End: 2022-02-04
Payer: COMMERCIAL

## 2022-02-04 DIAGNOSIS — R06.83 SNORING: ICD-10-CM

## 2022-02-04 DIAGNOSIS — G47.33 OBSTRUCTIVE SLEEP APNEA (ADULT) (PEDIATRIC): ICD-10-CM

## 2022-02-04 PROCEDURE — 99443: CPT

## 2022-02-04 NOTE — ASSESSMENT
[FreeTextEntry1] : REVIEWED\par 1/2022: AHI 25.1; cheryl O2 saturation 85%; T88 0.8\par \par 42 yo with hx of HTN, HFrEF following up for HST results. HST c/w moderate MARICARMEN. Indicated for treatment. The benefits of MARICARMEN treatment in improving cardiac, neurologic, cognitive, and mortality outcomes were discussed. PAP and mandibular advancement therapy were discussed in detail. Would like to pursue PAP therapy. Equipment ordered; DME is [employer]; APAP set 5 to 20 cm H2O. Adherence goal is at least 4 hours of use per night on 70% of nights with an AHI of less than 10 events per hour. The ramifications of MARICARMEN and its treatment were discussed in detail. Follow up within 6 weeks of use or sooner if needed.\par

## 2022-02-04 NOTE — HISTORY OF PRESENT ILLNESS
[Home] : at home, [unfilled] , at the time of the visit. [Medical Office: ([facility])___] : at the medical office located in  [Verbal consent obtained from patient] : the patient, [unfilled] [FreeTextEntry1] : 40yo man with new HTN, hx of recent anabolic steroid use; reduced EF referred for MARICARMEN evaluation. Snores. Does feel sleepy during the day. Stopped anabolic steroids. No family hx of SDB.\par \par 2/4/2022\par Had HST and would like to discuss results. [ESS] : 12

## 2022-02-04 NOTE — REVIEW OF SYSTEMS
[Negative] : Psychiatric [Difficulty Initiating Sleep] : no difficulty falling asleep [Lower Extremity Discomfort] : no lower extremity discomfort [Late day/ Evening symptoms] : no late day/evening symptoms [Unusual Sleep Behavior] : no unusual sleep behavior

## 2022-02-25 ENCOUNTER — APPOINTMENT (OUTPATIENT)
Dept: HEART AND VASCULAR | Facility: CLINIC | Age: 42
End: 2022-02-25
Payer: COMMERCIAL

## 2022-02-25 VITALS
BODY MASS INDEX: 30.96 KG/M2 | DIASTOLIC BLOOD PRESSURE: 75 MMHG | HEIGHT: 69 IN | HEART RATE: 76 BPM | SYSTOLIC BLOOD PRESSURE: 131 MMHG | OXYGEN SATURATION: 93 % | TEMPERATURE: 96.8 F | WEIGHT: 209 LBS

## 2022-02-25 PROCEDURE — 99214 OFFICE O/P EST MOD 30 MIN: CPT

## 2022-02-25 NOTE — REASON FOR VISIT
[Symptom and Test Evaluation] : symptom and test evaluation [FreeTextEntry1] : Overall improvement since follow up. He was seen by PMD. No issues on labs. No issues with metoprolol. Exercising regularly. No syncope or chest pain.

## 2022-02-25 NOTE — DISCUSSION/SUMMARY
[FreeTextEntry1] : Borderline HTN - SANTA  and I had an extensive discussion regarding his blood pressure management. Patient will continue taking current medications in addition to maintaining a low Na diet, with periodic b/p checks at home.\par CP/CHF/Palps will check echo on follow up if symptoms noted meet [employer]IM speciality health requirements.\par Seen for sleep apnea

## 2022-03-02 ENCOUNTER — TRANSCRIPTION ENCOUNTER (OUTPATIENT)
Age: 42
End: 2022-03-02

## 2022-06-10 ENCOUNTER — APPOINTMENT (OUTPATIENT)
Dept: HEART AND VASCULAR | Facility: CLINIC | Age: 42
End: 2022-06-10
Payer: COMMERCIAL

## 2022-06-10 VITALS
HEART RATE: 79 BPM | TEMPERATURE: 98.5 F | BODY MASS INDEX: 29.62 KG/M2 | HEIGHT: 69 IN | WEIGHT: 200 LBS | DIASTOLIC BLOOD PRESSURE: 74 MMHG | SYSTOLIC BLOOD PRESSURE: 113 MMHG | OXYGEN SATURATION: 95 %

## 2022-06-10 PROCEDURE — 93306 TTE W/DOPPLER COMPLETE: CPT

## 2022-06-10 PROCEDURE — 99214 OFFICE O/P EST MOD 30 MIN: CPT

## 2022-06-10 NOTE — DISCUSSION/SUMMARY
[FreeTextEntry1] : Borderline HTN - SANTA  and I had an extensive discussion regarding his blood pressure management. Patient will continue taking current medications in addition to maintaining a low Na diet, with periodic b/p checks at home.\par Palps/cp cont metoprolol, follow up with PMD echo reviewed. \par RTC in 6 m

## 2022-06-10 NOTE — REASON FOR VISIT
[Symptom and Test Evaluation] : symptom and test evaluation [FreeTextEntry1] : Overall improvement since follow up. He was seen by PMD. No issues on labs. Exercising regularly. No syncope or chest pain.

## 2022-12-16 ENCOUNTER — NON-APPOINTMENT (OUTPATIENT)
Age: 42
End: 2022-12-16

## 2022-12-16 ENCOUNTER — APPOINTMENT (OUTPATIENT)
Dept: HEART AND VASCULAR | Facility: CLINIC | Age: 42
End: 2022-12-16

## 2022-12-16 VITALS
DIASTOLIC BLOOD PRESSURE: 82 MMHG | WEIGHT: 205.56 LBS | BODY MASS INDEX: 30.45 KG/M2 | SYSTOLIC BLOOD PRESSURE: 133 MMHG | HEIGHT: 69 IN | HEART RATE: 94 BPM | OXYGEN SATURATION: 94 % | TEMPERATURE: 97.8 F

## 2022-12-16 PROCEDURE — 99214 OFFICE O/P EST MOD 30 MIN: CPT | Mod: 25

## 2022-12-16 PROCEDURE — 93000 ELECTROCARDIOGRAM COMPLETE: CPT

## 2022-12-16 NOTE — DISCUSSION/SUMMARY
[FreeTextEntry1] : CHF/Cp/palp doing well with occasional dyspnea. check echo if able to approve and schedule.\par HTN - SANTA  and I had an extensive discussion regarding his blood pressure management. Patient will continue taking current medications in addition to maintaining a low Na diet, with periodic b/p checks at home.\par EKG section of the chart --- secondary to symptoms above an electrocardiogram also known as an EKG was performed.  Risks and benefits discussed with the patient. Patient was given time and privacy to changed into a gown. Shortly after, standard 10 leads were applied and a [employer] system was used to perform the study. The results were subsequently reviewed by attending physician and discussed with the patient. The study showed a normal sinus rhythm and no ST-T suggestive of ischemia. Order for the EKG was placed in the chart. The results were documented. Billing submitted. Emotional support provided.\par

## 2022-12-16 NOTE — REASON FOR VISIT
[Symptom and Test Evaluation] : symptom and test evaluation [FreeTextEntry1] : Overall improvement since follow up. He was seen by PMD. Doing well. exercising regularly. Occasional dyspnea noted. This is often noted with activity. Symptoms always improve at rest. he had a stress ekg about a year ago. \par

## 2023-06-23 ENCOUNTER — APPOINTMENT (OUTPATIENT)
Dept: HEART AND VASCULAR | Facility: CLINIC | Age: 43
End: 2023-06-23
Payer: COMMERCIAL

## 2023-06-23 VITALS
SYSTOLIC BLOOD PRESSURE: 129 MMHG | HEIGHT: 69 IN | HEART RATE: 80 BPM | BODY MASS INDEX: 29.62 KG/M2 | WEIGHT: 200 LBS | TEMPERATURE: 98 F | DIASTOLIC BLOOD PRESSURE: 80 MMHG | OXYGEN SATURATION: 95 %

## 2023-06-23 PROCEDURE — 99214 OFFICE O/P EST MOD 30 MIN: CPT

## 2023-06-23 PROCEDURE — 93306 TTE W/DOPPLER COMPLETE: CPT

## 2023-06-23 NOTE — REASON FOR VISIT
[Symptom and Test Evaluation] : symptom and test evaluation [FreeTextEntry1] : Overall improvement since follow up. He was seen by PMD. Doing well. exercising regularly. Occasional dyspnea noted. This is often noted with activity. Symptoms always improve at rest; no recent testing.

## 2023-06-23 NOTE — DISCUSSION/SUMMARY
[FreeTextEntry1] : Palps/Dizziness check carotid u/s if able to approve and schedule.\par CHF echo reviewed. LVEF appears normal Inclined towards a conservative follow up in this patient. We had a careful discussion regarding diet and exercise. Will be happy to re-evaluate.\par Borderline HTN - SANTA  and I had an extensive discussion regarding his blood pressure management. Patient will continue taking current medications in addition to maintaining a low Na diet, with periodic b/p checks at home.\par

## 2023-11-17 ENCOUNTER — APPOINTMENT (OUTPATIENT)
Dept: HEART AND VASCULAR | Facility: CLINIC | Age: 43
End: 2023-11-17
Payer: COMMERCIAL

## 2023-11-17 VITALS
SYSTOLIC BLOOD PRESSURE: 117 MMHG | HEIGHT: 69 IN | BODY MASS INDEX: 31.03 KG/M2 | HEART RATE: 90 BPM | WEIGHT: 209.5 LBS | TEMPERATURE: 98.3 F | OXYGEN SATURATION: 95 % | DIASTOLIC BLOOD PRESSURE: 75 MMHG

## 2023-11-17 PROCEDURE — 93880 EXTRACRANIAL BILAT STUDY: CPT

## 2023-11-17 PROCEDURE — 99214 OFFICE O/P EST MOD 30 MIN: CPT

## 2024-03-14 ENCOUNTER — APPOINTMENT (OUTPATIENT)
Dept: ORTHOPEDIC SURGERY | Facility: CLINIC | Age: 44
End: 2024-03-14
Payer: COMMERCIAL

## 2024-03-14 VITALS — BODY MASS INDEX: 28.88 KG/M2 | WEIGHT: 195 LBS | HEIGHT: 69 IN

## 2024-03-14 PROCEDURE — 73030 X-RAY EXAM OF SHOULDER: CPT | Mod: LT

## 2024-03-14 PROCEDURE — 20610 DRAIN/INJ JOINT/BURSA W/O US: CPT | Mod: LT

## 2024-03-14 PROCEDURE — 99203 OFFICE O/P NEW LOW 30 MIN: CPT | Mod: 25

## 2024-03-14 RX ORDER — NABUMETONE 500 MG/1
500 TABLET, FILM COATED ORAL
Qty: 60 | Refills: 0 | Status: ACTIVE | COMMUNITY
Start: 2024-03-14 | End: 1900-01-01

## 2024-03-20 NOTE — HISTORY OF PRESENT ILLNESS
[de-identified] : Initial Visit: Left shoulder pain Reason: Bench press in gym Duration: 6 weeks  Prior studies:  x rays  Symptoms: throbbing / soreness/ tenderness Surgical Hx: none Medical Hx: none Aggravating Fx: pushing / lateral lifts Alleviating fX: resting / aware of specific movements  Pain: 4/10 Pain Med: Ibuprofen / Naproxen  Allergies: NKA

## 2024-03-20 NOTE — PROCEDURE
[de-identified] : Patient has demonstrated limited relief from NSAIDS, rest, exercises / PT, and after discussion of the risks and benefits, the patient has elected to proceed with a corticosteroid injection into the LEFT shoulder via Posterolateral site. Confirmed that the patient does not have history of prior adverse reactions, active, infections, or relevant allergies.   There was no erythema or warmth, and the skin was clear.  The skin was sterilized with alcohol and via sterile technique, the shoulder was injected with 3 cc of 1% xylocaine and 40 mg of Kenalog.  The injection was completed without complication and a bandage was applied.  The patient tolerated the procedure well and was given post-injection instructions.

## 2024-03-20 NOTE — ASSESSMENT
[FreeTextEntry1] : Discussed at length with patient regarding symptoms and diagnosis.  Treatment options discussed and patient's questions answered and patient expresses understanding. If persistent symptoms consideration to MRI Patient offered physical therapy and elects home exercises.  Patient to follow-up in office if persistent or recurrent symptoms.  Patient instructed to call if any questions or concerns.

## 2024-03-20 NOTE — PHYSICAL EXAM
[de-identified] : Left Shoulder: Constitutional: The patient is healthy-appearing and in no apparent distress.   Cardiovascular System:  The capillary refill is less than 2 seconds.   Skin:  There are no skin abnormalities.  C-Spine/Neck:  Active Range of Motion: Flexion				50 Extension			60 Lateral rotation			80    Left Shoulder:  Inspection:  There is no atrophy, erythema, warmth, swelling. There is no scapular winging. There is no AC prominence.   Bony Palpation:  There is no tenderness of the clavicle. There is no tenderness of the acromioclavicular joint. There is no tenderness of the greater tuberosity.  There is no tenderness of the bicipital groove.   Soft Tissue Palpation:  There is no tenderness of the trapezius. There is no tenderness of the rhomboid. There is no tenderness of the subacromial bursa.   Active Range of Motion:  Forward flexion- 				180  Abduction-					150 External rotation at 0 degrees abduction-	80  Internal rotation at 0 degrees abduction-	80  Passive Range of Motion:  Forward flexion- 			180  Abduction-				150 External rotation at 0 deg abduction-	80  Internal rotation at 0 deg abduction-	80  Strength: Supraspinatus / Abduction                  5/5 External rotation                                 5/5 Internal rotation                                   5/5  Special Tests:  Hawkin's  				Positive  Neer's  				Positive  Speed's  				Negative AC cross-over 			            Negative Marston's  				Negative  Neurological System:   There is normal sensation to light touch C5-T1.   Stability:  There is no general laxity.   Psychiatric:  The patient demonstrates a normal mood and affect and is active and alert [de-identified] : x-ray left shoulder: There is no arthritis or fracture

## 2024-04-08 ENCOUNTER — APPOINTMENT (OUTPATIENT)
Dept: ORTHOPEDIC SURGERY | Facility: CLINIC | Age: 44
End: 2024-04-08
Payer: COMMERCIAL

## 2024-04-08 DIAGNOSIS — M75.52 BURSITIS OF LEFT SHOULDER: ICD-10-CM

## 2024-04-08 PROCEDURE — 99213 OFFICE O/P EST LOW 20 MIN: CPT | Mod: 25

## 2024-04-08 PROCEDURE — 20610 DRAIN/INJ JOINT/BURSA W/O US: CPT | Mod: LT

## 2024-04-08 NOTE — PHYSICAL EXAM
[de-identified] : Left Shoulder: Constitutional: The patient is healthy-appearing and in no apparent distress.   Cardiovascular System:  The capillary refill is less than 2 seconds.   Skin:  There are no skin abnormalities.  C-Spine/Neck:  Active Range of Motion: Flexion				50 Extension			60 Lateral rotation			80    Left Shoulder:  Inspection:  There is no atrophy, erythema, warmth, swelling. There is no scapular winging. There is no AC prominence.   Bony Palpation:  There is no tenderness of the clavicle. There is no tenderness of the acromioclavicular joint. There is no tenderness of the greater tuberosity.  There is no tenderness of the bicipital groove.   Soft Tissue Palpation:  There is no tenderness of the trapezius. There is no tenderness of the rhomboid. There is no tenderness of the subacromial bursa.   Active Range of Motion:  Forward flexion- 				180  Abduction-					150 External rotation at 0 degrees abduction-	80  Internal rotation at 0 degrees abduction-	80  Passive Range of Motion:  Forward flexion- 			180  Abduction-				150 External rotation at 0 deg abduction-	80  Internal rotation at 0 deg abduction-	80  Strength: Supraspinatus / Abduction                  5/5 External rotation                                 5/5 Internal rotation                                   5/5  Special Tests:  Hawkin's  				Positive  Neer's  				Positive  Speed's  				Negative AC cross-over 			            Negative Scio's  				Negative  Neurological System:   There is normal sensation to light touch C5-T1.   Stability:  There is no general laxity.   Psychiatric:  The patient demonstrates a normal mood and affect and is active and alert

## 2024-04-08 NOTE — PROCEDURE
[de-identified] : Patient has demonstrated limited relief from NSAIDS, rest, exercises / PT, and after discussion of the risks and benefits, the patient has elected to proceed with a corticosteroid injection into the LEFT shoulder via Posterolateral site. Confirmed that the patient does not have history of prior adverse reactions, active, infections, or relevant allergies.   There was no erythema or warmth, and the skin was clear.  The skin was sterilized with alcohol and via sterile technique, the shoulder was injected with 3 cc of 1% xylocaine and 40 mg of Kenalog.  The injection was completed without complication and a bandage was applied.  The patient tolerated the procedure well and was given post-injection instructions.

## 2024-04-08 NOTE — HISTORY OF PRESENT ILLNESS
[de-identified] : Last Visit: 3/14/2024 Reason: Left shoulder pain Pain: 3/10 - 7/10 worse at night Symptoms:  pain is radiating from shoulder - down arm - connor/ chest area Home exercises:  completing - not working

## 2024-06-07 ENCOUNTER — APPOINTMENT (OUTPATIENT)
Dept: HEART AND VASCULAR | Facility: CLINIC | Age: 44
End: 2024-06-07
Payer: COMMERCIAL

## 2024-06-07 ENCOUNTER — NON-APPOINTMENT (OUTPATIENT)
Age: 44
End: 2024-06-07

## 2024-06-07 VITALS
BODY MASS INDEX: 28.14 KG/M2 | HEIGHT: 69 IN | TEMPERATURE: 97.6 F | WEIGHT: 190 LBS | HEART RATE: 85 BPM | DIASTOLIC BLOOD PRESSURE: 76 MMHG | OXYGEN SATURATION: 94 % | SYSTOLIC BLOOD PRESSURE: 120 MMHG

## 2024-06-07 DIAGNOSIS — R00.2 PALPITATIONS: ICD-10-CM

## 2024-06-07 DIAGNOSIS — R03.0 ELEVATED BLOOD-PRESSURE READING, W/OUT DIAGNOSIS OF HYPERTENSION: ICD-10-CM

## 2024-06-07 DIAGNOSIS — R07.9 CHEST PAIN, UNSPECIFIED: ICD-10-CM

## 2024-06-07 DIAGNOSIS — I42.9 HEART FAILURE, UNSPECIFIED: ICD-10-CM

## 2024-06-07 DIAGNOSIS — I50.9 HEART FAILURE, UNSPECIFIED: ICD-10-CM

## 2024-06-07 PROCEDURE — 99214 OFFICE O/P EST MOD 30 MIN: CPT | Mod: 25

## 2024-06-07 PROCEDURE — G2211 COMPLEX E/M VISIT ADD ON: CPT | Mod: NC

## 2024-06-07 PROCEDURE — 93000 ELECTROCARDIOGRAM COMPLETE: CPT

## 2024-06-07 RX ORDER — METOPROLOL SUCCINATE 50 MG/1
50 TABLET, EXTENDED RELEASE ORAL DAILY
Qty: 90 | Refills: 3 | Status: ACTIVE | COMMUNITY
Start: 2021-11-30 | End: 1900-01-01

## 2024-06-07 NOTE — REASON FOR VISIT
[FreeTextEntry1] : 43M comes in for a re-evaluation of cp and dyspnea. He lost a few lbs. He is exercising regularly. Possible shoulder surgery coming up in the coming months.

## 2024-06-07 NOTE — DISCUSSION/SUMMARY
[FreeTextEntry1] : CP/palps/SOB check echo if able to approve and schedule. Borderline HTN - SANTA  and I had an extensive discussion regarding his blood pressure management. Patient will continue taking current medications in addition to maintaining a low Na diet, with periodic b/p checks at home. HLD check Ca score if able to approve and schedule. EKG section of the chart --- secondary to symptoms above an electrocardiogram also known as an EKG was performed.  Risks and benefits discussed with the patient. Patient was given time and privacy to changed into a gown. Shortly after, standard 10 leads were applied and a [employer] system was used to perform the study. The results were subsequently reviewed by attending physician and discussed with the patient. The study showed a normal sinus rhythm and no ST-T suggestive of ischemia. Order for the EKG was placed in the chart. The results were documented. Billing submitted. [EKG obtained to assist in diagnosis and management of assessed problem(s)] : EKG obtained to assist in diagnosis and management of assessed problem(s)

## 2024-07-01 ENCOUNTER — RX RENEWAL (OUTPATIENT)
Age: 44
End: 2024-07-01

## 2024-10-04 ENCOUNTER — RX RENEWAL (OUTPATIENT)
Age: 44
End: 2024-10-04

## 2024-12-06 ENCOUNTER — APPOINTMENT (OUTPATIENT)
Dept: HEART AND VASCULAR | Facility: CLINIC | Age: 44
End: 2024-12-06
Payer: COMMERCIAL

## 2024-12-06 ENCOUNTER — NON-APPOINTMENT (OUTPATIENT)
Age: 44
End: 2024-12-06

## 2024-12-06 VITALS
WEIGHT: 194 LBS | DIASTOLIC BLOOD PRESSURE: 78 MMHG | OXYGEN SATURATION: 94 % | HEIGHT: 69 IN | SYSTOLIC BLOOD PRESSURE: 128 MMHG | HEART RATE: 97 BPM | BODY MASS INDEX: 28.73 KG/M2

## 2024-12-06 DIAGNOSIS — I42.9 HEART FAILURE, UNSPECIFIED: ICD-10-CM

## 2024-12-06 DIAGNOSIS — I50.9 HEART FAILURE, UNSPECIFIED: ICD-10-CM

## 2024-12-06 DIAGNOSIS — R00.2 PALPITATIONS: ICD-10-CM

## 2024-12-06 DIAGNOSIS — R03.0 ELEVATED BLOOD-PRESSURE READING, W/OUT DIAGNOSIS OF HYPERTENSION: ICD-10-CM

## 2024-12-06 DIAGNOSIS — R00.0 TACHYCARDIA, UNSPECIFIED: ICD-10-CM

## 2024-12-06 DIAGNOSIS — R07.9 CHEST PAIN, UNSPECIFIED: ICD-10-CM

## 2024-12-06 PROCEDURE — 99214 OFFICE O/P EST MOD 30 MIN: CPT

## 2024-12-06 PROCEDURE — 93000 ELECTROCARDIOGRAM COMPLETE: CPT

## 2024-12-06 PROCEDURE — 93306 TTE W/DOPPLER COMPLETE: CPT

## 2024-12-06 PROCEDURE — G2211 COMPLEX E/M VISIT ADD ON: CPT | Mod: NC

## 2025-04-29 ENCOUNTER — OUTPATIENT (OUTPATIENT)
Dept: OUTPATIENT SERVICES | Facility: HOSPITAL | Age: 45
LOS: 1 days | End: 2025-04-29

## 2025-04-29 ENCOUNTER — APPOINTMENT (OUTPATIENT)
Dept: CT IMAGING | Facility: CLINIC | Age: 45
End: 2025-04-29
Payer: COMMERCIAL

## 2025-04-29 PROCEDURE — 75571 CT HRT W/O DYE W/CA TEST: CPT | Mod: 26

## 2025-06-09 ENCOUNTER — NON-APPOINTMENT (OUTPATIENT)
Age: 45
End: 2025-06-09

## 2025-06-09 ENCOUNTER — APPOINTMENT (OUTPATIENT)
Dept: HEART AND VASCULAR | Facility: CLINIC | Age: 45
End: 2025-06-09
Payer: COMMERCIAL

## 2025-06-09 VITALS
DIASTOLIC BLOOD PRESSURE: 86 MMHG | HEART RATE: 88 BPM | TEMPERATURE: 97.7 F | SYSTOLIC BLOOD PRESSURE: 135 MMHG | BODY MASS INDEX: 30.36 KG/M2 | HEIGHT: 69 IN | WEIGHT: 205 LBS | OXYGEN SATURATION: 97 %

## 2025-06-09 PROCEDURE — G2211 COMPLEX E/M VISIT ADD ON: CPT | Mod: NC

## 2025-06-09 PROCEDURE — 99214 OFFICE O/P EST MOD 30 MIN: CPT

## 2025-06-09 PROCEDURE — 93000 ELECTROCARDIOGRAM COMPLETE: CPT
